# Patient Record
Sex: FEMALE | Race: OTHER
[De-identification: names, ages, dates, MRNs, and addresses within clinical notes are randomized per-mention and may not be internally consistent; named-entity substitution may affect disease eponyms.]

---

## 2021-05-05 ENCOUNTER — HOSPITAL ENCOUNTER (EMERGENCY)
Dept: HOSPITAL 61 - ER | Age: 26
Discharge: HOME | End: 2021-05-05
Payer: SELF-PAY

## 2021-05-05 VITALS — DIASTOLIC BLOOD PRESSURE: 55 MMHG | SYSTOLIC BLOOD PRESSURE: 93 MMHG

## 2021-05-05 VITALS — WEIGHT: 130.07 LBS | HEIGHT: 64 IN | BODY MASS INDEX: 22.21 KG/M2

## 2021-05-05 DIAGNOSIS — Z20.822: ICD-10-CM

## 2021-05-05 DIAGNOSIS — R06.02: Primary | ICD-10-CM

## 2021-05-05 DIAGNOSIS — F17.210: ICD-10-CM

## 2021-05-05 DIAGNOSIS — R42: ICD-10-CM

## 2021-05-05 DIAGNOSIS — R06.4: ICD-10-CM

## 2021-05-05 DIAGNOSIS — R11.2: ICD-10-CM

## 2021-05-05 LAB
ALBUMIN SERPL-MCNC: 4.1 G/DL (ref 3.4–5)
ALBUMIN/GLOB SERPL: 1.4 {RATIO} (ref 1–1.7)
ALP SERPL-CCNC: 64 U/L (ref 46–116)
ALT SERPL-CCNC: 39 U/L (ref 14–59)
AMORPH SED URNS QL MICRO: PRESENT /HPF
AMPHETAMINE/METHAMPHETAMINE: (no result)
ANION GAP SERPL CALC-SCNC: 10 MMOL/L (ref 6–14)
APTT PPP: YELLOW S
AST SERPL-CCNC: 25 U/L (ref 15–37)
BACTERIA #/AREA URNS HPF: (no result) /HPF
BARBITURATES UR-MCNC: (no result) UG/ML
BASOPHILS # BLD AUTO: 0 X10^3/UL (ref 0–0.2)
BASOPHILS NFR BLD: 0 % (ref 0–3)
BENZODIAZ UR-MCNC: (no result) UG/L
BILIRUB SERPL-MCNC: 0.4 MG/DL (ref 0.2–1)
BILIRUB UR QL STRIP: NEGATIVE
BUN SERPL-MCNC: 18 MG/DL (ref 7–20)
BUN/CREAT SERPL: 23 (ref 6–20)
CALCIUM SERPL-MCNC: 8.9 MG/DL (ref 8.5–10.1)
CANNABINOIDS UR-MCNC: (no result) UG/L
CHLORIDE SERPL-SCNC: 105 MMOL/L (ref 98–107)
CO2 SERPL-SCNC: 27 MMOL/L (ref 21–32)
COCAINE UR-MCNC: (no result) NG/ML
CREAT SERPL-MCNC: 0.8 MG/DL (ref 0.6–1)
EOSINOPHIL NFR BLD: 0 % (ref 0–3)
EOSINOPHIL NFR BLD: 0 X10^3/UL (ref 0–0.7)
ERYTHROCYTE [DISTWIDTH] IN BLOOD BY AUTOMATED COUNT: 12.6 % (ref 11.5–14.5)
FIBRINOGEN PPP-MCNC: CLEAR MG/DL
GFR SERPLBLD BASED ON 1.73 SQ M-ARVRAT: 86.7 ML/MIN
GLUCOSE SERPL-MCNC: 93 MG/DL (ref 70–99)
HCT VFR BLD CALC: 37.8 % (ref 36–47)
HGB BLD-MCNC: 13.1 G/DL (ref 12–15.5)
LIPASE: 102 U/L (ref 73–393)
LYMPHOCYTES # BLD: 1 X10^3/UL (ref 1–4.8)
LYMPHOCYTES NFR BLD AUTO: 7 % (ref 24–48)
MAGNESIUM SERPL-MCNC: 1.8 MG/DL (ref 1.8–2.4)
MCH RBC QN AUTO: 32 PG (ref 25–35)
MCHC RBC AUTO-ENTMCNC: 35 G/DL (ref 31–37)
MCV RBC AUTO: 91 FL (ref 79–100)
METHADONE SERPL-MCNC: (no result) NG/ML
MONO #: 0.5 X10^3/UL (ref 0–1.1)
MONOCYTES NFR BLD: 3 % (ref 0–9)
NEUT #: 12 X10^3/UL (ref 1.8–7.7)
NEUTROPHILS NFR BLD AUTO: 89 % (ref 31–73)
NITRITE UR QL STRIP: NEGATIVE
OPIATES UR-MCNC: (no result) NG/ML
PCP SERPL-MCNC: (no result) MG/DL
PH UR STRIP: 8 [PH]
PLATELET # BLD AUTO: 207 X10^3/UL (ref 140–400)
POTASSIUM SERPL-SCNC: 4.5 MMOL/L (ref 3.5–5.1)
PROT SERPL-MCNC: 7 G/DL (ref 6.4–8.2)
PROT UR STRIP-MCNC: NEGATIVE MG/DL
RBC # BLD AUTO: 4.17 X10^6/UL (ref 3.5–5.4)
RBC #/AREA URNS HPF: 0 /HPF (ref 0–2)
SODIUM SERPL-SCNC: 142 MMOL/L (ref 136–145)
UROBILINOGEN UR-MCNC: 0.2 MG/DL
WBC # BLD AUTO: 13.4 X10^3/UL (ref 4–11)
WBC #/AREA URNS HPF: (no result) /HPF (ref 0–4)

## 2021-05-05 PROCEDURE — 83690 ASSAY OF LIPASE: CPT

## 2021-05-05 PROCEDURE — 93005 ELECTROCARDIOGRAM TRACING: CPT

## 2021-05-05 PROCEDURE — 99285 EMERGENCY DEPT VISIT HI MDM: CPT

## 2021-05-05 PROCEDURE — 83735 ASSAY OF MAGNESIUM: CPT

## 2021-05-05 PROCEDURE — 81001 URINALYSIS AUTO W/SCOPE: CPT

## 2021-05-05 PROCEDURE — 84443 ASSAY THYROID STIM HORMONE: CPT

## 2021-05-05 PROCEDURE — 83880 ASSAY OF NATRIURETIC PEPTIDE: CPT

## 2021-05-05 PROCEDURE — 81025 URINE PREGNANCY TEST: CPT

## 2021-05-05 PROCEDURE — 36415 COLL VENOUS BLD VENIPUNCTURE: CPT

## 2021-05-05 PROCEDURE — 80053 COMPREHEN METABOLIC PANEL: CPT

## 2021-05-05 PROCEDURE — 80307 DRUG TEST PRSMV CHEM ANLYZR: CPT

## 2021-05-05 PROCEDURE — 85025 COMPLETE CBC W/AUTO DIFF WBC: CPT

## 2021-05-05 PROCEDURE — 71045 X-RAY EXAM CHEST 1 VIEW: CPT

## 2021-05-05 PROCEDURE — 84484 ASSAY OF TROPONIN QUANT: CPT

## 2021-05-05 PROCEDURE — U0003 INFECTIOUS AGENT DETECTION BY NUCLEIC ACID (DNA OR RNA); SEVERE ACUTE RESPIRATORY SYNDROME CORONAVIRUS 2 (SARS-COV-2) (CORONAVIRUS DISEASE [COVID-19]), AMPLIFIED PROBE TECHNIQUE, MAKING USE OF HIGH THROUGHPUT TECHNOLOGIES AS DESCRIBED BY CMS-2020-01-R: HCPCS

## 2021-05-05 NOTE — RAD
Site ID: [id number]



EXAMINATION: XR CHEST 1V.



HISTORY: 25 years  Female  Reason: shortness of breath .



COMPARISON: None.



Findings: The lungs are clear. The heart size is normal. There is no effusion or pneumothorax.



The mediastinum and emerson appear unremarkable.



Impression: Unremarkable study.



Electronically signed by: Yosvany Gonzalez MD (5/5/2021 4:48 PM) [id number]

## 2021-05-05 NOTE — PHYS DOC
Past Medical History


Past Medical History:  No Pertinent History


Past Surgical History:  


Smoking Status:  Current Every Day Smoker


Additional Information:  


SMOKES 2 CIGARETTES A DAY


Alcohol Use:  Occasionally





General Adult


EDM:


Chief Complaint:  SHORTNESS OF BREATH





HPI:


HPI:





Patient is a 25  year old female who presents to the ED today stating she had a 

shower this afternoon and became dizzy, an episode of nausea, vomiting short of 

air.  Patient denies falling.  Denies any chest pain, denies any personal or 

family history of DVTs or PEs, denies being on any birth control or using any ho

rmones.  Denies any recent hospitalization or long car rides.  Denies any 

unilateral leg pain.





Patient speaks [place] and is able to answer most of the question, boyfriend is 

interpreting for [place] as well





Review of Systems:


Review of Systems:


Constitutional:   Denies fever or chills. []


Eyes:   Denies change in visual acuity. []


HENT:   Denies nasal congestion or sore throat. [] 


Respiratory: Reports shortness of breath.  Denies cough 


Cardiovascular:   Denies chest pain or edema. [] 


GI:   Reports nausea and vomiting.  Denies abdominal pain, bloody stools or 

diarrhea. [] 


:  Denies dysuria. [] 


Musculoskeletal:   Denies back pain or joint pain. [] 


Integument:   Denies rash. [] 


Neurologic: Reports dizziness denies headache, focal weakness or sensory 

changes. [] 


Psychiatric:  Denies depression or anxiety. []





Heart Score:


C/O Chest Pain:  N/A


Risk Factors:


Risk Factors:  DM, Current or recent (<one month) smoker, HTN, HLP, family 

history of CAD, obesity.


Risk Scores:


Score 0 - 3:  2.5% MACE over next 6 weeks - Discharge Home


Score 4 - 6:  20.3% MACE over next 6 weeks - Admit for Clinical Observation


Score 7 - 10:  72.7% MACE over next 6 weeks - Early Invasive Strategies





Allergies:


Allergies:





Allergies








Coded Allergies Type Severity Reaction Last Updated Verified


 


  No Known Drug Allergies    21 No











Physical Exam:


PE:





Constitutional: Well developed, well nourished, no acute distress, non-toxic 

appearance. []


HENT: Normocephalic, atraumatic, bilateral external ears normal, oropharynx 

moist, no oral exudates, nose normal. []


Eyes: PERRLA, EOMI, conjunctiva normal, no discharge. [] 


Neck: Normal range of motion, no tenderness, supple, no stridor. [] 


Cardiovascular:Heart rate regular rhythm, no murmur []


Lungs & Thorax:  Bilateral breath sounds clear to auscultation []


Abdomen: Bowel sounds normal, soft, no tenderness, no masses, no pulsatile 

masses. [] 


Skin: Warm, dry, no erythema, no rash. [] 


Back: No tenderness, no CVA tenderness. [] 


Extremities: No tenderness, no cyanosis, no clubbing, ROM intact, no edema. [] 


Neurologic: Alert and oriented X 3, normal motor function, normal sensory 

function, no focal deficits noted.  Cranial nerves II through XII intact


Psychologic: Appears anxious, hyperventilating





Current Patient Data:


Labs:





                                Laboratory Tests








Test


 21


15:40 21


15:45 21


16:10 21


17:33


 


Urine Opiates Screen Neg (NEG)     


 


Urine Methadone Screen Neg (NEG)     


 


Urine Barbiturates Neg (NEG)     


 


Urine Phencyclidine Screen Neg (NEG)     


 


Urine


Amphetamine/Methamphetamine Neg (NEG)  


 


 


 





 


Urine Benzodiazepines Screen Neg (NEG)     


 


Urine Cocaine Screen Neg (NEG)     


 


Urine Cannabinoids Screen Pos (NEG)     


 


Urine Ethyl Alcohol Neg (NEG)     


 


POC Urine HCG, Qualitative


 


 Hcg negative


(Negative) 


 





 


White Blood Count


 


 


 13.4 x10^3/uL


(4.0-11.0)  H 





 


Red Blood Count


 


 


 4.17 x10^6/uL


(3.50-5.40) 





 


Hemoglobin


 


 


 13.1 g/dL


(12.0-15.5) 





 


Hematocrit


 


 


 37.8 %


(36.0-47.0) 





 


Mean Corpuscular Volume


 


 


 91 fL ()


 





 


Mean Corpuscular Hemoglobin   32 pg (25-35)   


 


Mean Corpuscular Hemoglobin


Concent 


 


 35 g/dL


(31-37) 





 


Red Cell Distribution Width


 


 


 12.6 %


(11.5-14.5) 





 


Platelet Count


 


 


 207 x10^3/uL


(140-400) 





 


Neutrophils (%) (Auto)   89 % (31-73)  H 


 


Lymphocytes (%) (Auto)   7 % (24-48)  L 


 


Monocytes (%) (Auto)   3 % (0-9)   


 


Eosinophils (%) (Auto)   0 % (0-3)   


 


Basophils (%) (Auto)   0 % (0-3)   


 


Neutrophils # (Auto)


 


 


 12.0 x10^3/uL


(1.8-7.7)  H 





 


Lymphocytes # (Auto)


 


 


 1.0 x10^3/uL


(1.0-4.8) 





 


Monocytes # (Auto)


 


 


 0.5 x10^3/uL


(0.0-1.1) 





 


Eosinophils # (Auto)


 


 


 0.0 x10^3/uL


(0.0-0.7) 





 


Basophils # (Auto)


 


 


 0.0 x10^3/uL


(0.0-0.2) 





 


Sodium Level


 


 


 142 mmol/L


(136-145) 





 


Potassium Level


 


 


 4.5 mmol/L


(3.5-5.1) 





 


Chloride Level


 


 


 105 mmol/L


() 





 


Carbon Dioxide Level


 


 


 27 mmol/L


(21-32) 





 


Anion Gap   10 (6-14)   


 


Blood Urea Nitrogen


 


 


 18 mg/dL


(7-20) 





 


Creatinine


 


 


 0.8 mg/dL


(0.6-1.0) 





 


Estimated GFR


(Cockcroft-Gault) 


 


 86.7  


 





 


BUN/Creatinine Ratio   23 (6-20)  H 


 


Glucose Level


 


 


 93 mg/dL


(70-99) 





 


Calcium Level


 


 


 8.9 mg/dL


(8.5-10.1) 





 


Magnesium Level


 


 


 1.8 mg/dL


(1.8-2.4) 





 


Total Bilirubin


 


 


 0.4 mg/dL


(0.2-1.0) 





 


Aspartate Amino Transferase


(AST) 


 


 25 U/L (15-37)


 





 


Alanine Aminotransferase (ALT)


 


 


 39 U/L (14-59)


 





 


Alkaline Phosphatase


 


 


 64 U/L


() 





 


Troponin I Quantitative


 


 


 < 0.017 ng/mL


(0.000-0.055) 





 


NT-Pro-B-Type Natriuretic


Peptide 


 


 49 pg/mL


(0-124) 





 


Total Protein


 


 


 7.0 g/dL


(6.4-8.2) 





 


Albumin


 


 


 4.1 g/dL


(3.4-5.0) 





 


Albumin/Globulin Ratio   1.4 (1.0-1.7)   


 


Lipase


 


 


 102 U/L


() 





 


Thyroid Stimulating Hormone


(TSH) 


 


 0.835 uIU/mL


(0.358-3.74) 





 


Urine Collection Type    Unknown  


 


Urine Color    Yellow  


 


Urine Clarity    Clear  


 


Urine pH


 


 


 


 8.0 (<5.0-8.0)





 


Urine Specific Gravity


 


 


 


 <=1.005


(1.000-1.030)


 


Urine Protein


 


 


 


 Negative mg/dL


(NEG-TRACE)


 


Urine Glucose (UA)


 


 


 


 Negative mg/dL


(NEG)


 


Urine Ketones (Stick)


 


 


 


 Negative mg/dL


(NEG)


 


Urine Blood


 


 


 


 Negative (NEG)





 


Urine Nitrite


 


 


 


 Negative (NEG)





 


Urine Bilirubin


 


 


 


 Negative (NEG)





 


Urine Urobilinogen Dipstick


 


 


 


 0.2 mg/dL (0.2


mg/dL)


 


Urine Leukocyte Esterase


 


 


 


 Negative (NEG)





 


Urine RBC    0 /HPF (0-2)  


 


Urine WBC


 


 


 


 1-4 /HPF (0-4)





 


Urine Squamous Epithelial


Cells 


 


 


 Few /LPF  





 


Urine Amorphous Sediment    Present /HPF  


 


Urine Bacteria


 


 


 


 Few /HPF


(0-FEW)


 


Urine Mucus    Slight /LPF  


 


Test


 21


17:35 


 


 





 


POC Urine HCG, Qualitative


 Hcg negative


(Negative) 


 


 








                                Laboratory Tests


21 16:10








                                Laboratory Tests


21 16:10








Vital Signs:





                                   Vital Signs








  Date Time  Temp Pulse Resp B/P (MAP) Pulse Ox O2 Delivery O2 Flow Rate FiO2


 


21 17:33  79  105/60 (75) 100 Room Air  


 


21 17:03   13     


 


21 15:23 98.5       





 98.5       











EKG:


EK interpreted by Dr. Byrne sinus rhythm heart rate 84 no STEMI []





Radiology/Procedures:


Radiology/Procedures:


[]PROCEDURE: PORTABLE CHEST 1V





Site ID: [id number]





EXAMINATION: XR CHEST 1V.





HISTORY: 25 years  Female  Reason: shortness of breath .





COMPARISON: None.





Findings: The lungs are clear. The heart size is normal. There is no effusion or

 pneumothorax.





The mediastinum and emerson appear unremarkable.





Impression: Unremarkable study.





Electronically signed by: Ryan Gonzalez MD (2021 4:48 PM) [id number]














DICTATED and SIGNED BY:     RYAN GONZALEZ MD


DATE:     21 [id number]





Course & Med Decision Making:


Course & Med Decision Making


Pertinent Labs and Imaging studies reviewed. (See chart for details)





This is a 25-year-old female patient presenting to the ED today complaining of 

dizziness, shortness of breath, an episode of nausea and vomiting, symptoms 

began after taking a shower.  Patient arrives in the ED hyperventilating.





EKG is negative, chest x-ray is negative, labs are negative for any acute 

findings.  Tested for COVID-19, discharged home.  Return precautions provided.





Dragon Disclaimer:


Dragon Disclaimer:


This electronic medical record was generated, in whole or in part, using a voice

 recognition dictation system.





Departure


Departure


Impression:  


   Primary Impression:  


   Shortness of breath


   Additional Impressions:  


   Hyperventilation


   Dizziness


   Nausea and vomiting


   Qualified Codes:  R11.2 - Nausea with vomiting, unspecified


Disposition:   HOME / SELF CARE / HOMELESS


Condition:  STABLE


Referrals:  


NO PCP (PCP)


Follow-up with your doctor in 1-2


Patient Instructions:  Anxiety and Panic Attacks, Dizziness, Easy-to-Read, 

Nausea and Vomiting





Additional Instructions:  


You were evaluated in the emergency room, your work-up is negative for any acute

 findings.  Follow-up with your doctor next week 1-2 weeks


Scripts


Ondansetron (ONDANSETRON ODT) 4 Mg Tab.rapdis


1 TAB PO PRN Q6-8HRS, #16 TAB


   Prov: ELIA QUINTANA         21











ELIA QUINTANA             May 5, 2021 18:22